# Patient Record
Sex: FEMALE | URBAN - METROPOLITAN AREA
[De-identification: names, ages, dates, MRNs, and addresses within clinical notes are randomized per-mention and may not be internally consistent; named-entity substitution may affect disease eponyms.]

---

## 2020-12-30 ENCOUNTER — VIRTUAL VISIT (OUTPATIENT)
Dept: FAMILY MEDICINE | Facility: OTHER | Age: 20
End: 2020-12-30

## 2020-12-31 NOTE — PROGRESS NOTES
"Date: 2020 22:38:00  Clinician: Hamzah Sena  Clinician NPI: [id number]  Patient: Amina Lowe  Patient : 2000  Patient Address: [address] [place], [place] [address] [address]  Patient Phone: [phone]  Visit Protocol: Low back pain  Patient Summary:  Amina is a 20 year old ( : 2000 ) female who initiated a [facility] Visit for evaluation of Low Back Pain. When asked the question \"Please sign me up to receive news, health information and promotions from [facility].\", Amina responded \"No\".    Her back pain began suddenly 1-6 days ago. The pain is present on both sides and is located in the buttocks, low back, and mid back area.   The pain varies depending on the activity. The pain decreases when she bends forward.   She is able to walk on her toes and is able to walk on her heels with her toes lifted off the ground.     Symptom Details   Pain: The pain is severe (7-9 on a 10 point pain scale).    Amina denies back muscle spasms, loss of bowel control, numbness or tingling in the legs, shooting pain, vomiting, urinary retention, saddle anesthesia, urinary frequency, dysuria, abdominal pain, feeling feverish, hematuria, leg weakness, loss of bladder control, and a rash in the same area as the back pain.   Precipitating events  The back pain did not begin in response to an injury that happened at her work. Her back pain began after something else other than the options provided.   Cause of back pain as reported by the patient (free text): Moved weird in the middle of the night when I was laying down   Pertinent medical history  Amina has had similar episodes of back pain in the past. She has not had cancer, back surgery, kidney stones, and unintended weight loss in the last three months.   She has not seen a provider to treat this episode of low back pain.   Amina has not been told by her provider to avoid NSAIDs.   Treatments  Amina tried using over-the-counter medications and therapeutic remedies " (such as cold pack, heat, chiropractic treatment, physical therapy) to manage her low back pain.   Additional details about medications tried as reported by the patient (free text): Ibuprofen II   Other approaches used to manage the back pain as reported by the patient (free text): Heated blanket   Amina has not tried using prescription medications to manage her back pain.   Amina needs a return to work/school note.   She denies pregnancy and denies breastfeeding. She does not menstruate.   Amina does not smoke or use smokeless tobacco.     MEDICATIONS: No current medications, ALLERGIES: doxycycline  Clinician Response:  Dear Amina,  Based on the information you provided, you likely have Mechanical Low Back Pain.   Low back pain that is caused by placing abnormal stress and muscle or soft tissue strain (also known as mechanical low back pain) is the most common form of back pain. The majority of cases are not related with a specific disease or abnormal structure of the spine and do not require diagnostic imaging (such as an X-ray, MRI or CAT scan). Heating, cooling, back exercises and stretches should reduce your back pain within 3-4 weeks. During this time, remain active.   Medication information  I am prescribing:     Cyclobenzaprine 10 mg oral tablet. Take 1 tablet by mouth 3 times per day as needed for 7 days. There are no refills with this prescription.   Self care  The following tips will help prevent and reduce back pain:     Apply heating pads on the sore area for a short duration (10 minutes per hour and as needed). A hot bath or a heating pad on your lower back may also help reduce pain and stiffness.    Maintaining a good posture reduces stress on the back muscles. To help reduce the stress on your back:    Stand and sit up straight.    Try not to slouch when standing or sitting.    Try not to stay in the same position for a long time.    Switch positions every 20 to 30 minutes if possible.    When  lifting heavy objects, squat down and use your legs rather than bending at the waist.          Stress can make your back pain worse. For this reason, take time to relax and try some relaxation techniques when you feel stressed.    Click the following link for more information: Prevent back pain.     If you have a history of fracture or osteoporosis, follow up with your primary care provider to find out whether your low back pain is caused by the fracture or osteoporosis.  When to seek care  Please make an appointment to be seen in a clinic or urgent care if any of the following occur:     Pain that doesn't seem to be getting better after 3 to 4 weeks    You develop new symptoms or your symptoms becomes worse    A painful rash that appears as a stripe along one side of the body    Unexplained fever    Unbearable pain    Unrelenting night pain     Seek immediate medical care if you have problems with bowel or bladder control, worsening weakness or numbness in your legs, or numbness in the inner thighs, groin, or buttocks.   Diagnosis: Mechanical low back pain  Diagnosis ICD: M54.5  Prescription: cyclobenzaprine 10 mg oral tablet 21 tablet, 7 days supply. Take 1 tablet by mouth 3 times per day as needed for 7 days. Refills: 0, Refill as needed: no, Allow substitutions: yes  Pharmacy: [id number] - [phone] - [address] [place] [address]